# Patient Record
Sex: MALE | Race: WHITE | HISPANIC OR LATINO | Employment: FULL TIME | URBAN - METROPOLITAN AREA
[De-identification: names, ages, dates, MRNs, and addresses within clinical notes are randomized per-mention and may not be internally consistent; named-entity substitution may affect disease eponyms.]

---

## 2018-12-19 ENCOUNTER — TELEPHONE (OUTPATIENT)
Dept: OTHER | Facility: CLINIC | Age: 28
End: 2018-12-19

## 2018-12-20 NOTE — TELEPHONE ENCOUNTER
12/19/2018    Call Regarding Onboarding P1 Other    Attempt 1    Message on voicemail    Comments: 0 dep       Outreach   Pia Marcus

## 2019-10-24 ENCOUNTER — OFFICE VISIT (OUTPATIENT)
Dept: FAMILY MEDICINE | Facility: CLINIC | Age: 29
End: 2019-10-24
Payer: COMMERCIAL

## 2019-10-24 VITALS
BODY MASS INDEX: 44.15 KG/M2 | DIASTOLIC BLOOD PRESSURE: 77 MMHG | RESPIRATION RATE: 16 BRPM | HEART RATE: 75 BPM | TEMPERATURE: 98.7 F | OXYGEN SATURATION: 100 % | SYSTOLIC BLOOD PRESSURE: 122 MMHG | HEIGHT: 66 IN | WEIGHT: 274.75 LBS

## 2019-10-24 DIAGNOSIS — R05.9 COUGH: ICD-10-CM

## 2019-10-24 DIAGNOSIS — L70.1 ACNE CONGLOBATA: ICD-10-CM

## 2019-10-24 DIAGNOSIS — R09.81 NASAL CONGESTION: Primary | ICD-10-CM

## 2019-10-24 RX ORDER — FLUTICASONE PROPIONATE 50 MCG
1 SPRAY, SUSPENSION (ML) NASAL 2 TIMES DAILY
Qty: 15.8 ML | Refills: 1 | Status: SHIPPED | OUTPATIENT
Start: 2019-10-24

## 2019-10-24 SDOH — HEALTH STABILITY: MENTAL HEALTH: HOW OFTEN DO YOU HAVE A DRINK CONTAINING ALCOHOL?: NEVER

## 2019-10-24 ASSESSMENT — PATIENT HEALTH QUESTIONNAIRE - PHQ9: SUM OF ALL RESPONSES TO PHQ QUESTIONS 1-9: 13

## 2019-10-24 ASSESSMENT — MIFFLIN-ST. JEOR: SCORE: 2155.63

## 2019-10-24 NOTE — PROGRESS NOTES
"  SUBJECTIVE:   Desmond Marques is a 29 year old male who presents to clinic today to establish care and to discuss the following problem(s).    - almost every night for the past month has noticed more difficulty breathing at night  - \"nose closes up\"  - \"like cold symptoms over night\" that keep him from sleeping  - did not feel that there any unusual symptoms otherwise at the start of the month  - will sometimes hearing wheezing with inspiration and exhalation  - also hears these sounds in the morning that improves during the rest of the day once he is more active  - breathing is not limiting his activities at work  - Does not feel congested during the day time  - no sinus pain/pressure/fullness  - no ear pain/pressure  - no sore throat    - over the past year has had cough productive of mucous, sometimes green mucous  - fairly consistent nasal congestion  - worse in the morning  - no chest pains  - no recent fevers/chills    - no personal history of asthma  - has a nephew with asthma but otherwise no family history of atopy including eczema, food allergies  - smoked a little in college but not since  - smokes MJ 3-4 times a week  - tried not smoking for 2 months and cough persisted  - no known pet or environmental allergies    - has gained about 20-25 lbs over the past year or year and a half    - has tried taking OTC nasal sprays which would provide short-term relief    # Skin Problem  - bump on the back of his neck, comes and goes  - will sometimes drain \"pus\" and when large will sometimes drain in other areas across back of neck beyond the primary lesion  - always in the same area  - has formed a scar  - seems similar to pilonidal cyst he's had in the past    ROS: Denies fevers, chills, chest pain, difficulty breathing, abdominal pain    Past Medical History:   Diagnosis Date     Acne      Past Surgical History:   Procedure Laterality Date     EXCISE PILONIDAL CYST, SIMPLE      2008, two surgeries that year " "    Family History   Problem Relation Age of Onset     Diabetes Maternal Grandmother      No Known Problems Half-Brother      No Known Problems Half-Sister      Heart Disease No family hx of      Prostate Cancer No family hx of      Colon Cancer No family hx of      Social History     Tobacco Use     Smoking status: Former Smoker     Packs/day: 0.00     Years: 2.00     Pack years: 0.00     Last attempt to quit: 2009     Years since quitting: 10.8     Smokeless tobacco: Never Used   Substance Use Topics     Alcohol use: Not Currently     Frequency: Never     Drug use: Yes     Types: Marijuana     Comment: 3-4 times a week, smokes     Social History     Patient does not qualify to have social determinant information on file (likely too young).   Social History Narrative    Works in building maintenance    Lives with mother, step-father, half-sister, and half-sister's 4 children (age 7-15)       Current Outpatient Medications   Medication     Multiple Vitamins-Minerals (MULTIVITAMIN ADULT PO)     No current facility-administered medications for this visit.      I have reviewed the patient's past medical, surgical, family, and social history.     OBJECTIVE:   /77 (BP Location: Left arm, Patient Position: Sitting, Cuff Size: Adult Large)   Pulse 75   Temp 98.7  F (37.1  C) (Oral)   Resp 16   Ht 1.671 m (5' 5.79\")   Wt 124.6 kg (274 lb 12 oz)   SpO2 100%   BMI 44.63 kg/m      Constitutional: well-appearing, appears stated age  Eyes: conjunctivae without erythema, sclera anicteric.   ENT: TM normal bilateral, posterior oropharynx normal. Nasal mucosa appears dry.  Cardiac: regular rate and rhythm, normal S1/S2, no murmur/rubs/gallops  Respiratory: lungs clear to auscultation bilaterally, normal work of breathing, no wheezes/crackles  Psych: affect is full and appropriate, speech is fluent and non-pressured          ASSESSMENT AND PLAN:     (R09.81) Nasal congestion  (primary encounter diagnosis)  (R05) " Cough  Comment: Chronic cough and nasal congestion for the past year, worse nocturnally for the past month. Consistent with chronic rhinosinusitis. Low suspicion for intrapulmonary process given prominence of his nasal symptoms but if sinus treatments not effective would consider chest x-ray, possible PFTs. Start with saline rinses (encouraged neti pot) and intranasal steroid. Advised smoking cessation at the least until cough ends.   Will see me again in 3 weeks. If no improvement plan to refer to ENT.  Plan: fluticasone (FLONASE) 50 MCG/ACT nasal spray        (L70.1) Acne conglobata  Comment: Exam consistent with acne conglobata, has significant scarring and sinus tracts. Allergic to minocycline. Will send to dermatology to discuss isotretinoin and other treatment options.   Plan: DERMATOLOGY REFERRAL          Rob Seals MD   [facility]  10/24/2019, 2:02 PM

## 2019-10-24 NOTE — NURSING NOTE
"29 year old  Chief Complaint   Patient presents with     Sleep Problem     x 1 month waking up in the middle of the night with nasal congestion     Nasal Congestion     haven't felt clear for 2 years     Cough     very productive mucus clear or slight green     Acne     constant spot that on neck, acne under the skin on the back     Rash     bottom of left foot patch of irritated skin     Knee left     lots of clicking with knee and ankle       Blood pressure 122/77, pulse 75, temperature 98.7  F (37.1  C), temperature source Oral, resp. rate 16, height 1.671 m (5' 5.79\"), weight 124.6 kg (274 lb 12 oz), SpO2 100 %. Body mass index is 44.63 kg/m .  There is no problem list on file for this patient.      Wt Readings from Last 2 Encounters:   10/24/19 124.6 kg (274 lb 12 oz)     BP Readings from Last 3 Encounters:   10/24/19 122/77         No current outpatient medications on file.     No current facility-administered medications for this visit.        Social History     Tobacco Use     Smoking status: Never Smoker     Smokeless tobacco: Never Used   Substance Use Topics     Alcohol use: Not Currently     Frequency: Never     Drug use: Yes     Types: Marijuana     Comment: 3-4 times a week        Health Maintenance Due   Topic Date Due     PREVENTIVE CARE VISIT  1991     HIV SCREENING  01/27/2006     PHQ-2  01/01/2019     INFLUENZA VACCINE (1) 09/01/2019       No results found for: PAP      October 24, 2019 1:49 PM    "

## 2019-10-24 NOTE — PATIENT INSTRUCTIONS
1) Start using Fluticasone (Flonase) nasal spray 1 puff both nostrils twice a day for the next 3 weeks  2) Also start using using a saline nasal spray (or neti pot). Just be sure to use this either before the Flonase or at another time in the day.  3) stop smoking  4) wash your sheets if you haven't in a while

## 2019-11-12 NOTE — PROGRESS NOTES
"  SUBJECTIVE:   Desmond Marques is a 29 year old male who presents to clinic today for a return visit.    # Chronic Congestion  # Chronic Cough  - first seen for this on 10/23/19  - recommended neti pot, Flonase  - has been using Flonase  - maybe a \"slight improvement\"  - still sleep disturbance  - still rattling with breathing in and draining green colored sputum  - not coughing    - intermittent brief itching in both ears  - uses q-tips daily    ROS: Denies fevers, chills, chest pain, difficulty breathing, abdominal pain    Patient Active Problem List   Diagnosis     Acne conglobata     Current Outpatient Medications   Medication     fluticasone (FLONASE) 50 MCG/ACT nasal spray     Multiple Vitamins-Minerals (MULTIVITAMIN ADULT PO)     No current facility-administered medications for this visit.        I have reviewed the patient's relevant past medical history.     OBJECTIVE:   /82 (BP Location: Right arm, Patient Position: Sitting, Cuff Size: Adult Large)   Pulse 72   Temp 98.5  F (36.9  C) (Oral)   Resp 16   Wt 125.6 kg (277 lb)   SpO2 97%   BMI 45.00 kg/m      Constitutional: well-appearing, appears stated age  Eyes: conjunctivae without erythema, sclera anicteric.   ENT: TM normal bilateral. External canals appear normal except for complete absence of cerumen.   Skin: no rashes, lesions, or wounds  Psych: affect is full and appropriate, speech is fluent and non-pressured      ASSESSMENT AND PLAN:     (J32.9) Chronic sinusitis, unspecified location  (primary encounter diagnosis)  Comment: Advised continue Flonase. Increasing frequency of neti pot irrigations (has only done twice). Referring to ENT given failure to improve but it has still been early in this treatment.   Plan: OTOLARYNGOLOGY REFERRAL          (L29.9) Ear itching  Comment: Likely from overly aggressive grooming with q-tips drying skin of external canals. Advised stopping q-tips.   Plan:     Rob Seals MD   [facility]  11/14/2019, 10:44 AM

## 2019-11-14 ENCOUNTER — OFFICE VISIT (OUTPATIENT)
Dept: FAMILY MEDICINE | Facility: CLINIC | Age: 29
End: 2019-11-14
Payer: COMMERCIAL

## 2019-11-14 VITALS
RESPIRATION RATE: 16 BRPM | BODY MASS INDEX: 45 KG/M2 | TEMPERATURE: 98.5 F | DIASTOLIC BLOOD PRESSURE: 82 MMHG | OXYGEN SATURATION: 97 % | HEART RATE: 72 BPM | SYSTOLIC BLOOD PRESSURE: 128 MMHG | WEIGHT: 277 LBS

## 2019-11-14 DIAGNOSIS — L29.9 EAR ITCHING: ICD-10-CM

## 2019-11-14 DIAGNOSIS — J32.9 CHRONIC SINUSITIS, UNSPECIFIED LOCATION: Primary | ICD-10-CM

## 2019-11-14 NOTE — NURSING NOTE
29 year old  Chief Complaint   Patient presents with     Nasal Congestion     Derm Problem     was not able to  medication insurance would not cover       Blood pressure 128/82, pulse 72, temperature 98.5  F (36.9  C), temperature source Oral, resp. rate 16, weight 125.6 kg (277 lb), SpO2 97 %. Body mass index is 45 kg/m .  Patient Active Problem List   Diagnosis     Acne conglobata       Wt Readings from Last 2 Encounters:   11/14/19 125.6 kg (277 lb)   10/24/19 124.6 kg (274 lb 12 oz)     BP Readings from Last 3 Encounters:   11/14/19 128/82   10/24/19 122/77         Current Outpatient Medications   Medication     fluticasone (FLONASE) 50 MCG/ACT nasal spray     Multiple Vitamins-Minerals (MULTIVITAMIN ADULT PO)     No current facility-administered medications for this visit.        Social History     Tobacco Use     Smoking status: Former Smoker     Packs/day: 0.00     Years: 2.00     Pack years: 0.00     Last attempt to quit: 2009     Years since quitting: 10.8     Smokeless tobacco: Never Used   Substance Use Topics     Alcohol use: Not Currently     Frequency: Never     Drug use: Yes     Types: Marijuana     Comment: 3-4 times a week, smokes       Health Maintenance Due   Topic Date Due     PREVENTIVE CARE VISIT  1991     HIV SCREENING  01/27/2006     INFLUENZA VACCINE (1) 09/01/2019       No results found for: PAP      November 14, 2019 10:26 AM

## 2019-11-14 NOTE — LETTER
2019    RE: Desmond Marques  [address] [place] [address]        To whom it may concern,      Desmond Marques ( 1990) had an office visit with me today.    Please excuse from work for this appointment.      Thank you,        Rob Seals MD  10:53 AM, 2019

## 2019-11-16 NOTE — TELEPHONE ENCOUNTER
FUTURE VISIT INFORMATION      FUTURE VISIT INFORMATION:    Date: 12/5/19    Time: 10AM    Location: [facility]  REFERRAL INFORMATION:    Referring provider:  Rob Seals MD    Referring providers clinic: [facility]    Reason for visit/diagnosis  Chronic sinusitis, unspecified location     RECORDS REQUESTED FROM:       Clinic name Comments Records Status Imaging Status   [facility] 11/14/19 notes with Dr Seals EPIC

## 2019-12-05 ENCOUNTER — PRE VISIT (OUTPATIENT)
Dept: OTOLARYNGOLOGY | Facility: CLINIC | Age: 29
End: 2019-12-05

## 2020-03-11 ENCOUNTER — HEALTH MAINTENANCE LETTER (OUTPATIENT)
Age: 30
End: 2020-03-11

## 2021-01-03 ENCOUNTER — HEALTH MAINTENANCE LETTER (OUTPATIENT)
Age: 31
End: 2021-01-03

## 2021-04-25 ENCOUNTER — HEALTH MAINTENANCE LETTER (OUTPATIENT)
Age: 31
End: 2021-04-25

## 2021-10-10 ENCOUNTER — HEALTH MAINTENANCE LETTER (OUTPATIENT)
Age: 31
End: 2021-10-10

## 2022-05-21 ENCOUNTER — HEALTH MAINTENANCE LETTER (OUTPATIENT)
Age: 32
End: 2022-05-21

## 2022-09-18 ENCOUNTER — HEALTH MAINTENANCE LETTER (OUTPATIENT)
Age: 32
End: 2022-09-18

## 2023-06-04 ENCOUNTER — HEALTH MAINTENANCE LETTER (OUTPATIENT)
Age: 33
End: 2023-06-04

## 2024-04-09 ENCOUNTER — OFFICE VISIT (OUTPATIENT)
Dept: INTERNAL MEDICINE | Facility: CLINIC | Age: 34
End: 2024-04-09
Payer: COMMERCIAL

## 2024-04-09 ENCOUNTER — NURSE TRIAGE (OUTPATIENT)
Dept: NURSING | Facility: CLINIC | Age: 34
End: 2024-04-09

## 2024-04-09 VITALS
SYSTOLIC BLOOD PRESSURE: 124 MMHG | WEIGHT: 292.5 LBS | OXYGEN SATURATION: 97 % | HEART RATE: 99 BPM | BODY MASS INDEX: 47.01 KG/M2 | HEIGHT: 66 IN | DIASTOLIC BLOOD PRESSURE: 76 MMHG | TEMPERATURE: 98.2 F

## 2024-04-09 DIAGNOSIS — B35.3 TINEA PEDIS OF LEFT FOOT: ICD-10-CM

## 2024-04-09 DIAGNOSIS — L73.2 HIDRADENITIS SUPPURATIVA: Primary | ICD-10-CM

## 2024-04-09 DIAGNOSIS — A63.0 GENITAL WARTS: ICD-10-CM

## 2024-04-09 DIAGNOSIS — A63.0 GENITAL WARTS: Primary | ICD-10-CM

## 2024-04-09 PROCEDURE — 99385 PREV VISIT NEW AGE 18-39: CPT | Mod: GC

## 2024-04-09 RX ORDER — CLOTRIMAZOLE 1 %
CREAM (GRAM) TOPICAL 2 TIMES DAILY
Qty: 15 G | Refills: 1 | Status: SHIPPED | OUTPATIENT
Start: 2024-04-09

## 2024-04-09 RX ORDER — DOXYCYCLINE 100 MG/1
100 CAPSULE ORAL DAILY
Qty: 90 CAPSULE | Refills: 0 | Status: SHIPPED | OUTPATIENT
Start: 2024-04-09 | End: 2024-07-08

## 2024-04-09 NOTE — TELEPHONE ENCOUNTER
Triage Call:    Caller: Patient  Had an establishing care visit today.    He was told he would be given trichloroacetic acid 80% but it wasn't at the pharmacy after he had meds tx to [facility] from [facility].  .      Upon review of chart, medication was ordered as a clinic administered medication, not sent to any pharmacy      New pharmacy: [facility] on [address] [facility].      Advised will route to clinic team and ask that they resent the medication to the pharmacy.       Rossy Kruse RN on 4/9/2024 at 6:15 PM      Reason for Disposition   [1] Caller has NON-URGENT medicine question about med that PCP prescribed AND [2] triager unable to answer question    Protocols used: Medication Question Call-A-AH

## 2024-04-09 NOTE — PROGRESS NOTES
"Preventive Care Visit  [facility]  Humaira Maria MD, Internal Medicine  Apr 9, 2024      Assessment & Plan     1. Hidradenitis suppurativa  - Adult Dermatology  Referral; Future  - doxycycline hyclate (VIBRAMYCIN) 100 MG capsule; Take 1 capsule (100 mg) by mouth daily for 90 days  - Recommended to premedicate with an antihistamine if having any localized urticaria.   -If diffuse breakout, then recommend stopping the medication and taking pictures to be sent to PCP.   - If having respiratory symptoms,then recommended to stop the medication and seek immediate medical attention    2. Genital wart involving the left testicle  - trichloroacetic acid 80 % external solution LIQD 15 mL    3. Tinea pedis of left foot  - clotrimazole (LOTRIMIN) 1 % external cream; Apply topically 2 times daily    4. HCM  - Due for varicella immunization; otherwise up to date   -Given limited time during today's visit, will discuss at next visit  - No screenings due at this time.  - RTC: 1 year    BMI  Estimated body mass index is 47.01 kg/m  as calculated from the following:    Height as of this encounter: 1.68 m (5' 6.14\").    Weight as of this encounter: 132.7 kg (292 lb 8 oz).   Weight management plan: Discussed healthy diet and exercise guidelines    Work on weight loss      Return in about 1 year (around 4/9/2025).    Subjective     34-year-old male, with ongoing history of \"skin issues', who presents to the clinic to discuss ongoing skin concerns and a new testicular growth for the past several months.    History of Present Illness    Regarding the skin concerns: patient explains that he initially has a single \"zit\", that is non-erythematous, non-tender, non-edematous, or urticaric,  that eventually grows the size of a boil for which he has to apply heat and then manually decompress in order to remove it. The years of this constant process has resulted in diffuse scarring across his " "body; he notes that the boils primarily form in his groin regions, armpit regions, and the base of his neck. He was previously given a referral to dermatology who recommended at that time, undergoing a \"celebrity treatment\" but the patient was not able to afford it at that time but he can now. Previously given a course of minocycline for his condition, but developed significant urticaria on his lower thights, so he stopped the treatment. He also complains of slightly erythema, urticaria, and moderate odor from his left foot. No recent fevers, chills, joint pain, abdominal pain, chest pain, dyspnea.    Regarding his testicular growth: he reports it appearing a like skin tag. It started to grow over the past several months (3-4) and is superficial and localized to his left testicular. Denies any pain with urination or difficulty starting a stream. No erythema or drainage from the growth site. No change in size of the growth with position. Denies any recent unexplained weight loss. Patient is sexually active; endorses having multiple partners, solely female, during this year and uses condoms as the main form of protection.        4/9/2024   Social Factors   Worry food won't last until get money to buy more No   Food not last or not have enough money for food? No   Do you have housing?  Yes   Are you worried about losing your housing? No   Lack of transportation? No   Unable to get utilities (heat,electricity)? No       Today's PHQ-2 Score:       4/9/2024     8:20 AM   PHQ-2 ( 1999 [employer])   Q1: Little interest or pleasure in doing things 0   Q2: Feeling down, depressed or hopeless 0   PHQ-2 Score 0   Q1: Little interest or pleasure in doing things Not at all   Q2: Feeling down, depressed or hopeless Not at all   PHQ-2 Score 0       Social History     Tobacco Use    Smoking status: Former     Packs/day: 0.00     Years: 2.00     Additional pack years: 0.00     Total pack years: 0.00     Types: Cigarettes     Quit date: " "2009     Years since quitting: 15.2    Smokeless tobacco: Never   Substance Use Topics    Alcohol use: Not Currently    Drug use: Yes     Types: Marijuana     Comment: 3-4 times a week, smokes              Reviewed and updated as needed this visit by Provider                    Past Medical History:   Diagnosis Date    Acne      Past Surgical History:   Procedure Laterality Date    EXCISE PILONIDAL CYST, SIMPLE      2008, two surgeries that year     Allergies   Allergen Reactions    Minocycline Hives     See Care Everywhere 2016 dermatology notes for details of reaction       Review of Systems  Constitutional, HEENT, cardiovascular, pulmonary, gi and gu systems are negative, except as otherwise noted.     Objective    Physical Exam  /76 (BP Location: Right arm, Patient Position: Sitting, Cuff Size: Adult Large)   Pulse 99   Temp 98.2  F (36.8  C) (Oral)   Ht 1.68 m (5' 6.14\")   Wt 132.7 kg (292 lb 8 oz)   SpO2 97%   BMI 47.01 kg/m     Estimated body mass index is 47.01 kg/m  as calculated from the following:    Height as of this encounter: 1.68 m (5' 6.14\").    Weight as of this encounter: 132.7 kg (292 lb 8 oz).      GENERAL: alert and no distress  NECK: no adenopathy, no asymmetry, masses, or scars  RESP: lungs clear to auscultation - no rales, rhonchi or wheezes  CV: regular rate and rhythm, normal S1 S2, no S3 or S4, no murmur, click or rub, no peripheral edema  ABDOMEN: soft, nontender, no hepatosplenomegaly, no masses and bowel sounds normal   (male): superficial \"cauliflower\" appearing lesion on left testicle, non-erythema, non-edematous   MS: no gross musculoskeletal defects noted, no edema  SKIN: wart -  left testicle , scar -  groin, neck, and armpits , excoriation -  chest ,       Signed Electronically by: Humaira Maria MD    "

## 2024-04-09 NOTE — PATIENT INSTRUCTIONS
Desmond Marques, it was a pleasure seeing you in clinic today (April 9, 2024). To briefly summarize our plan moving forward:    1) Take the doxycycline medication daily for your hidradenitis. If you are experiencing any hives, then you can premedicate with benadryl or another antihistamine. If the hives are very severe, then stop the medication and take pictures of the rash and send in. If you experience any breathing issues or swelling of the face or neck, then stop the medication and please seek immediate medical attention. A dermatology referral has been placed to assist you in further management of your condition.    2) For your testicular wart, apply the TCA once every 3-4 weeks. Follow the instructions and apply directly on the wart.    3) Use the clotrimazole (antifungal) cream on your left foot for 3-5 weeks. Make sure to thoroughly wash the feet before hand and maintain good foot hygiene.    If you have any further questions or concerns, please call the clinic ([phone]) or send a message via the [employer] feature.   Thank you for entrusting me with your care, and I look forward to seeing you at your next clinic visit.    Humaira Maria MD  Internal Medicine  PGY-1  [facility]

## 2024-04-09 NOTE — PROGRESS NOTES
"I, Federico Rodriguez MD saw the patient with the resident, and agree with the resident's findings and plan of care as documented in the resident's note.  /76 (BP Location: Right arm, Patient Position: Sitting, Cuff Size: Adult Large)   Pulse 99   Temp 98.2  F (36.8  C) (Oral)   Ht 1.68 m (5' 6.14\")   Wt 132.7 kg (292 lb 8 oz)   SpO2 97%   BMI 47.01 kg/m    I personally reviewed vital signs and past record.  Key findings: skin/acne concerns @ groin, armpit, chest - previously said to be acne but presentation seems more consistent with hidradenitis suppurativa  Reports scrotal skin tag.    "

## 2024-07-14 ENCOUNTER — HEALTH MAINTENANCE LETTER (OUTPATIENT)
Age: 34
End: 2024-07-14

## 2024-10-16 ENCOUNTER — MYC MEDICAL ADVICE (OUTPATIENT)
Dept: INTERNAL MEDICINE | Facility: CLINIC | Age: 34
End: 2024-10-16
Payer: COMMERCIAL

## 2024-10-16 NOTE — TELEPHONE ENCOUNTER
Left Voicemail (1st Attempt) and Sent Mychart (1st Attempt) for the patient to call back and schedule the following:    Appointment type: [facility] Physical   Provider: PCP  Return date: April 2025   Specialty phone number: [phone]  Additonal Notes: per check out - Return in about 1 year (around 4/9/2025)

## 2024-10-18 NOTE — TELEPHONE ENCOUNTER
Left Voicemail (2nd Attempt) and Sent Mychart (2nd Attempt) for the patient to call back and schedule the following:    Appointment type: [facility] Physical  Provider: Dr. Maria or Dr. Rodriguez  Return date: 4/10/25

## 2025-07-19 ENCOUNTER — HEALTH MAINTENANCE LETTER (OUTPATIENT)
Age: 35
End: 2025-07-19